# Patient Record
Sex: MALE | Race: WHITE | Employment: FULL TIME | URBAN - METROPOLITAN AREA
[De-identification: names, ages, dates, MRNs, and addresses within clinical notes are randomized per-mention and may not be internally consistent; named-entity substitution may affect disease eponyms.]

---

## 2025-04-04 ENCOUNTER — HOSPITAL ENCOUNTER (EMERGENCY)
Age: 30
Discharge: HOME | End: 2025-04-04
Attending: EMERGENCY MEDICINE

## 2025-04-04 VITALS
HEIGHT: 68 IN | DIASTOLIC BLOOD PRESSURE: 74 MMHG | RESPIRATION RATE: 16 BRPM | OXYGEN SATURATION: 99 % | WEIGHT: 180 LBS | HEART RATE: 60 BPM | BODY MASS INDEX: 27.28 KG/M2 | TEMPERATURE: 98.2 F | SYSTOLIC BLOOD PRESSURE: 122 MMHG

## 2025-04-04 DIAGNOSIS — R11.2 NAUSEA AND VOMITING, UNSPECIFIED VOMITING TYPE: Primary | ICD-10-CM

## 2025-04-04 DIAGNOSIS — F12.90 MARIJUANA USE: ICD-10-CM

## 2025-04-04 LAB
ALBUMIN SERPL-MCNC: 4.8 G/DL (ref 3.5–5.2)
ALBUMIN/GLOB SERPL: 1.5 {RATIO} (ref 1–2.5)
ALP SERPL-CCNC: 93 U/L (ref 40–129)
ALT SERPL-CCNC: 21 U/L (ref 5–41)
ANION GAP SERPL CALCULATED.3IONS-SCNC: 17 MMOL/L (ref 9–17)
AST SERPL-CCNC: 26 U/L
BASOPHILS # BLD: 0 K/UL (ref 0–0.2)
BASOPHILS NFR BLD: 0 % (ref 0–2)
BILIRUB DIRECT SERPL-MCNC: 0.2 MG/DL
BILIRUB INDIRECT SERPL-MCNC: 0.8 MG/DL (ref 0–1)
BILIRUB SERPL-MCNC: 1 MG/DL (ref 0.3–1.2)
BUN SERPL-MCNC: 16 MG/DL (ref 6–20)
CALCIUM SERPL-MCNC: 9.4 MG/DL (ref 8.6–10.4)
CHLORIDE SERPL-SCNC: 105 MMOL/L (ref 98–107)
CO2 SERPL-SCNC: 20 MMOL/L (ref 20–31)
CREAT SERPL-MCNC: 0.8 MG/DL (ref 0.7–1.2)
EOSINOPHIL # BLD: 0 K/UL (ref 0–0.4)
EOSINOPHILS RELATIVE PERCENT: 0 % (ref 1–4)
ERYTHROCYTE [DISTWIDTH] IN BLOOD BY AUTOMATED COUNT: 13.5 % (ref 12.5–15.4)
GFR, ESTIMATED: >90 ML/MIN/1.73M2
GLUCOSE SERPL-MCNC: 146 MG/DL (ref 70–99)
HCT VFR BLD AUTO: 44.8 % (ref 41–53)
HGB BLD-MCNC: 16.1 G/DL (ref 13.5–17.5)
LIPASE SERPL-CCNC: 47 U/L (ref 13–60)
LYMPHOCYTES NFR BLD: 1.5 K/UL (ref 1–4.8)
LYMPHOCYTES RELATIVE PERCENT: 14 % (ref 24–44)
MAGNESIUM SERPL-MCNC: 2 MG/DL (ref 1.6–2.6)
MCH RBC QN AUTO: 30.5 PG (ref 26–34)
MCHC RBC AUTO-ENTMCNC: 35.8 G/DL (ref 31–37)
MCV RBC AUTO: 85.1 FL (ref 80–100)
MONOCYTES NFR BLD: 0.9 K/UL (ref 0.1–1.2)
MONOCYTES NFR BLD: 8 % (ref 2–11)
NEUTROPHILS NFR BLD: 78 % (ref 36–66)
NEUTS SEG NFR BLD: 8.8 K/UL (ref 1.8–7.7)
PLATELET # BLD AUTO: 277 K/UL (ref 140–450)
PMV BLD AUTO: 8.1 FL (ref 6–12)
POTASSIUM SERPL-SCNC: 3.7 MMOL/L (ref 3.7–5.3)
PROT SERPL-MCNC: 7.9 G/DL (ref 6.4–8.3)
RBC # BLD AUTO: 5.27 M/UL (ref 4.5–5.9)
SODIUM SERPL-SCNC: 142 MMOL/L (ref 135–144)
WBC OTHER # BLD: 11.2 K/UL (ref 3.5–11)

## 2025-04-04 PROCEDURE — 6360000002 HC RX W HCPCS: Performed by: NURSE PRACTITIONER

## 2025-04-04 PROCEDURE — 2580000003 HC RX 258: Performed by: NURSE PRACTITIONER

## 2025-04-04 PROCEDURE — 99284 EMERGENCY DEPT VISIT MOD MDM: CPT

## 2025-04-04 PROCEDURE — 83690 ASSAY OF LIPASE: CPT

## 2025-04-04 PROCEDURE — 96372 THER/PROPH/DIAG INJ SC/IM: CPT

## 2025-04-04 PROCEDURE — 96375 TX/PRO/DX INJ NEW DRUG ADDON: CPT

## 2025-04-04 PROCEDURE — 83735 ASSAY OF MAGNESIUM: CPT

## 2025-04-04 PROCEDURE — 96376 TX/PRO/DX INJ SAME DRUG ADON: CPT

## 2025-04-04 PROCEDURE — 80048 BASIC METABOLIC PNL TOTAL CA: CPT

## 2025-04-04 PROCEDURE — 96361 HYDRATE IV INFUSION ADD-ON: CPT

## 2025-04-04 PROCEDURE — 80076 HEPATIC FUNCTION PANEL: CPT

## 2025-04-04 PROCEDURE — 96374 THER/PROPH/DIAG INJ IV PUSH: CPT

## 2025-04-04 PROCEDURE — 85025 COMPLETE CBC W/AUTO DIFF WBC: CPT

## 2025-04-04 RX ORDER — ONDANSETRON 2 MG/ML
4 INJECTION INTRAMUSCULAR; INTRAVENOUS ONCE
Status: COMPLETED | OUTPATIENT
Start: 2025-04-04 | End: 2025-04-04

## 2025-04-04 RX ORDER — HALOPERIDOL 5 MG/ML
2 INJECTION INTRAMUSCULAR ONCE
Status: COMPLETED | OUTPATIENT
Start: 2025-04-04 | End: 2025-04-04

## 2025-04-04 RX ORDER — 0.9 % SODIUM CHLORIDE 0.9 %
1000 INTRAVENOUS SOLUTION INTRAVENOUS ONCE
Status: COMPLETED | OUTPATIENT
Start: 2025-04-04 | End: 2025-04-04

## 2025-04-04 RX ADMIN — HALOPERIDOL LACTATE 2 MG: 5 INJECTION, SOLUTION INTRAMUSCULAR at 11:52

## 2025-04-04 RX ADMIN — ONDANSETRON 4 MG: 2 INJECTION, SOLUTION INTRAMUSCULAR; INTRAVENOUS at 11:22

## 2025-04-04 RX ADMIN — ONDANSETRON 4 MG: 2 INJECTION, SOLUTION INTRAMUSCULAR; INTRAVENOUS at 13:00

## 2025-04-04 RX ADMIN — FAMOTIDINE 20 MG: 10 INJECTION, SOLUTION INTRAVENOUS at 11:22

## 2025-04-04 RX ADMIN — SODIUM CHLORIDE 1000 ML: 0.9 INJECTION, SOLUTION INTRAVENOUS at 11:23

## 2025-04-04 ASSESSMENT — ENCOUNTER SYMPTOMS
SHORTNESS OF BREATH: 0
EYE DISCHARGE: 0
ABDOMINAL PAIN: 1
DIARRHEA: 0
NAUSEA: 1
CONSTIPATION: 0
BACK PAIN: 0
VOMITING: 1
COUGH: 0

## 2025-04-04 NOTE — ED PROVIDER NOTES
[facility] EMERGENCY DEPARTMENT  eMERGENCY dEPARTMENT eNCOUnter   Attending Physician Attestation    Pt Name: Aiden Mora  MRN: [id number]  Birthdate 1995  Date of evaluation: 4/4/25        I personally made/approves the management plan for this patient's and take responsibility for the patient management.      (Please note that portions of this note were completed with a voice recognition program.  Efforts were made to edit the dictations but occasionally words are mis-transcribed.)    Nicho Acosta DO  Attending Emergency  Physician               Nicho Acosta DO  04/04/25 [id number]

## 2025-04-04 NOTE — DISCHARGE INSTRUCTIONS
Take Zofran as previous prescribed or use your promethazine as previously prescribed.    Return to the ER: Fevers, chest pain or breathing difficulty, severe abdominal pain, continued vomiting, weakness, confusion; or any other concerning symptoms.

## 2025-04-04 NOTE — ED PROVIDER NOTES
N/v with chills started 3pm. ABCs intact.      Vitals:    Vitals:    04/04/25 1131 04/04/25 1201 04/04/25 1257 04/04/25 1437   BP: 137/75 126/69 130/75 122/74   Pulse:   61 60   Resp:   16 16   Temp:   98.2 °F (36.8 °C)    TempSrc:   Oral    SpO2: 96% 96% 99% 99%   Weight:       Height:               Medical Decision Making  The is a nontoxic-appearing 29-year-old male presenting to the emergency department via private auto he reports that his wife dropped him off; the patient started with nausea and vomiting around 6 PM last night reports that he ate at [employer] prior to this, he also has a history of cannabinoid cyclic vomiting, states the last time that he smoked marijuana was 4 to 5 days ago prior to arrival he was at the [facility] in [place], [place]; he was given 25 mg IM promethazine at the clinic, and recommended to come to the emergency department.  The patient denies any recent fevers chills coughs colds, has not had any international travel or known infectious disease exposures.  Denies diarrhea, hematemesis, constipation.  No previous abdominal surgeries.  No recent falls or traumas.    Screening lab work and urinalysis was ordered for further evaluation; will reevaluate patient's abdomen, await labs if at normal labs we will discuss ordering CT abdomen and pelvis.    Liter of normal saline, 20 mg IV push Pepcid, 4 mg IV push Zofran ordered to help with nausea vomiting symptoms.    Lab work returned CBC is negative for acute abnormality shows mild leukocytosis, BMP negative for acute abnormalities normal hepatic function, normal lipase.    Patient continuing to have nausea vomiting in the department, was given 2 mg IM Haldol.    Reviewed the patient's medical record, he was at the [facility] stand-alone ER yesterday for similar symptoms, had normal labs at that time as well with exception of a mildly elevated amylase at 115 in the setting of vomiting, normal lipase.    On reevaluation patient's abdomen remains soft, without peritoneal signs  completed with a voice recognition program.  Efforts were made to edit the dictations but occasionally words are mis-transcribed.)    SPEEDY Grant CNP (electronically signed)             Erma De León APRN - CNP  04/04/25 1670